# Patient Record
Sex: MALE
[De-identification: names, ages, dates, MRNs, and addresses within clinical notes are randomized per-mention and may not be internally consistent; named-entity substitution may affect disease eponyms.]

---

## 2019-07-26 ENCOUNTER — APPOINTMENT (OUTPATIENT)
Dept: OTOLARYNGOLOGY | Facility: CLINIC | Age: 44
End: 2019-07-26
Payer: COMMERCIAL

## 2019-07-26 VITALS
DIASTOLIC BLOOD PRESSURE: 60 MMHG | SYSTOLIC BLOOD PRESSURE: 106 MMHG | HEIGHT: 69 IN | HEART RATE: 60 BPM | WEIGHT: 180 LBS | BODY MASS INDEX: 26.66 KG/M2

## 2019-07-26 DIAGNOSIS — M26.629 ARTHRALGIA OF TEMPOROMANDIBULAR JOINT,: ICD-10-CM

## 2019-07-26 DIAGNOSIS — H61.23 IMPACTED CERUMEN, BILATERAL: ICD-10-CM

## 2019-07-26 DIAGNOSIS — Z78.9 OTHER SPECIFIED HEALTH STATUS: ICD-10-CM

## 2019-07-26 DIAGNOSIS — Z82.49 FAMILY HISTORY OF ISCHEMIC HEART DISEASE AND OTHER DISEASES OF THE CIRCULATORY SYSTEM: ICD-10-CM

## 2019-07-26 DIAGNOSIS — H93.13 TINNITUS, BILATERAL: ICD-10-CM

## 2019-07-26 PROCEDURE — 92557 COMPREHENSIVE HEARING TEST: CPT

## 2019-07-26 PROCEDURE — 92567 TYMPANOMETRY: CPT

## 2019-07-26 PROCEDURE — 69210 REMOVE IMPACTED EAR WAX UNI: CPT

## 2019-07-26 PROCEDURE — 31231 NASAL ENDOSCOPY DX: CPT

## 2019-07-26 PROCEDURE — 99203 OFFICE O/P NEW LOW 30 MIN: CPT | Mod: 25

## 2019-07-26 NOTE — ASSESSMENT
[FreeTextEntry1] : Chronic nasal congestion and possible seasonal allergies. I recommended allergy testing in followup thereafter. The he can also try over-the-counter Flonase and may ultimately add on Astelin\par \par Snoring and witnessed apneas, rule out obstructive sleep apnea. Recommended a home sleep study and to followup thereafter for further management.\par \par Reflux, I gave him written instructions on dietary and behavioral management, and he can take over-the-counter Pepcid as needed. We discussed possible rest check and GI evaluation if he fails to improve.\par \par Cerumen impaction, now resolved.  The patient was counseled in aural hygiene and Qtip avoidance.  I reassured him that his audiogram was normal\par \par

## 2019-07-26 NOTE — HISTORY OF PRESENT ILLNESS
[de-identified] : Patient seen for the first time today with multiple ENT complaints. First is chronic nasal congestion bilaterally and intermittent episodes of rhinitis several times per year that sometimes lead to cough. He has 2 young children with allergies, but has never had allergy testing. No history of recurrent rhinosinusitis. He thinks he may have seasonal allergies, but had no significant improvement with antihistamine therapy. Also notes intermittent snoring with witnessed apnea his daytime somnolence, but been awake. She has never had a sleep study. Also notes occasional globus sensation, and raspiness in his throat and voice. Has taken Pepcid with excellent relief. Also notes TMJ grinding guard was made for him by his dentist.

## 2019-08-26 ENCOUNTER — APPOINTMENT (OUTPATIENT)
Dept: OTOLARYNGOLOGY | Facility: CLINIC | Age: 44
End: 2019-08-26
Payer: COMMERCIAL

## 2019-08-26 VITALS
HEIGHT: 69 IN | HEART RATE: 68 BPM | BODY MASS INDEX: 26.66 KG/M2 | WEIGHT: 180 LBS | DIASTOLIC BLOOD PRESSURE: 76 MMHG | SYSTOLIC BLOOD PRESSURE: 118 MMHG

## 2019-08-26 DIAGNOSIS — G47.33 OBSTRUCTIVE SLEEP APNEA (ADULT) (PEDIATRIC): ICD-10-CM

## 2019-08-26 DIAGNOSIS — J31.0 CHRONIC RHINITIS: ICD-10-CM

## 2019-08-26 DIAGNOSIS — K21.9 GASTRO-ESOPHAGEAL REFLUX DISEASE W/OUT ESOPHAGITIS: ICD-10-CM

## 2019-08-26 PROCEDURE — 99213 OFFICE O/P EST LOW 20 MIN: CPT

## 2019-08-26 NOTE — HISTORY OF PRESENT ILLNESS
[de-identified] : Patient seen July 26 with multiple ENT complaints. First is chronic nasal congestion bilaterally and intermittent episodes of rhinitis several times per year that sometimes lead to cough. He has 2 young children with allergies, but has never had allergy testing. No history of recurrent rhinosinusitis. He thinks he may have seasonal allergies, but had no significant improvement with antihistamine therapy. Also notes intermittent snoring with witnessed apnea his daytime somnolence, but been awake. She has never had a sleep study. Also notes occasional globus sensation, and raspiness in his throat and voice. Has taken Pepcid with excellent relief. Also notes TMJ grinding guard was made for him by his dentist.\par \par Sleep study showed apnea hypopnea index of 4, lowest oxygen saturation 83%, he spends 10% of his sleep time below 88% saturation. From a reflux perspective, he has been eating earlier but did not try Pepcid because he has not been symptomatic recently. He did not get his allergy testing done and did not try Flonase as recommended.  New complaint today of decreased exercise tolerance

## 2019-08-26 NOTE — ASSESSMENT
[FreeTextEntry1] : Chronic nasal congestion and possible seasonal allergies. I again recommended allergy testing in followup thereafter. The he can also try over-the-counter Flonase and may ultimately add on Astelin.  \par \par Snoring and witnessed apneas, + obstructive sleep apnea On sleep test.. Recommended Followup with sleep center for further management, CPAP eval.\par \par Reflux, will treat if symptomatic--I gave him written instructions on dietary and behavioral management, and he can take over-the-counter Pepcid as needed. We discussed possible rest check and GI evaluation if he fails to improve.